# Patient Record
(demographics unavailable — no encounter records)

---

## 2025-04-09 NOTE — PHYSICAL EXAM
[Normocephalic] : normocephalic [EOMI] : extra ocular movement intact [Supple] : supple [No Supraclavicular Adenopathy] : no supraclavicular adenopathy [No Cervical Adenopathy] : no cervical adenopathy [de-identified] : L Breast/Axilla/Supraclavicular Area: no evidence of recurrence\par  R Breast/Axilla/Supraclavicular Area: no masses, discharge, or adenopathy\par

## 2025-04-09 NOTE — HISTORY OF PRESENT ILLNESS
[FreeTextEntry1] : Patient is a 64 y.o female here for breast cancer surveillance. Hx of LEFT lumpx in 2015 (age 53) with Dr. Cruz for high grade DCIS (ER+/FL+). S/p RT. No oconnell. Denies fhx of breast or ovarian cancer. She denies palpable breast masses or nipple discharge.   TNM Staging; pTis, pNx, pMx Stage 0  5/6/17: B/L MG- no evidence of disease 1/29/19: B/L MG- microcalcifications near lumpectomy site-magnification views recommended 1/30/19 L MG- KEVIN 1/6/20: B/l MG- KEVIN. 1/20/21: B/l MG- right- WNL, 2.5 cm stable mass, left microcalcifications 0.5 cm 12:00- biopsy recommended 1/29/21: L 12:00 stereo bx for calcs- path is benign breast tissue and calcs 7/13/21: L MG- stable- BI- RADS 2  1/12/22: B/L MG- right 2.5 cm stable mass 6:00, left postsurgical changes. BI-RADS 2 1/7/23: B/l MG- heterogeneously dense, R 6:00, 2.6cm stable mass. R medial, 1.cm circumscribed mass. L postsurgical changes. Benign BIRADS 2  3/4/24: B/l MG & US- scattered fibroglandular, R stable 2.5 cm mass 6:00, US- stable 0.8 cm mass 2:00 6FN, R stable 2.4 cm mass 6:00 9FN (c/w MG finding), KEVIN. BIRADS 2.  4/9/25: B/l MG/US-scattered fibroglandular density, right stable 1.5 and 0.7 cm [id number] 2

## 2025-04-09 NOTE — HISTORY OF PRESENT ILLNESS
[FreeTextEntry1] : Patient is a 64 y.o female here for breast cancer surveillance. Hx of LEFT lumpx in 2015 (age 53) with Dr. Cruz for high grade DCIS (ER+/CO+). S/p RT. No oconnell. Denies fhx of breast or ovarian cancer. She denies palpable breast masses or nipple discharge.   TNM Staging; pTis, pNx, pMx Stage 0  5/6/17: B/L MG- no evidence of disease 1/29/19: B/L MG- microcalcifications near lumpectomy site-magnification views recommended 1/30/19 L MG- KEIVN 1/6/20: B/l MG- KEVIN. 1/20/21: B/l MG- right- WNL, 2.5 cm stable mass, left microcalcifications 0.5 cm 12:00- biopsy recommended 1/29/21: L 12:00 stereo bx for calcs- path is benign breast tissue and calcs 7/13/21: L MG- stable- BI- RADS 2  1/12/22: B/L MG- right 2.5 cm stable mass 6:00, left postsurgical changes. BI-RADS 2 1/7/23: B/l MG- heterogeneously dense, R 6:00, 2.6cm stable mass. R medial, 1.cm circumscribed mass. L postsurgical changes. Benign BIRADS 2  3/4/24: B/l MG & US- scattered fibroglandular, R stable 2.5 cm mass 6:00, US- stable 0.8 cm mass 2:00 6FN, R stable 2.4 cm mass 6:00 9FN (c/w MG finding), KEVIN. BIRADS 2.  4/9/25: B/l MG/US-scattered fibroglandular density, right stable 1.5 and 0.7 cm [id number] 2

## 2025-04-09 NOTE — PAST MEDICAL HISTORY
[Postmenopausal] : The patient is postmenopausal [Menarche Age ____] : age at menarche was [unfilled] [Menopause Age____] : age at menopause was [unfilled] [History of Hormone Replacement Treatment] : has a history of hormone replacement treatment [Total Preg ___] : G[unfilled] [Abortions ___] : Abortions:[unfilled] [FreeTextEntry5] : no [FreeTextEntry6] : NO [FreeTextEntry7] : YES

## 2025-04-09 NOTE — PHYSICAL EXAM
[Normocephalic] : normocephalic [EOMI] : extra ocular movement intact [Supple] : supple [No Supraclavicular Adenopathy] : no supraclavicular adenopathy [No Cervical Adenopathy] : no cervical adenopathy [de-identified] : L Breast/Axilla/Supraclavicular Area: no evidence of recurrence\par  R Breast/Axilla/Supraclavicular Area: no masses, discharge, or adenopathy\par